# Patient Record
Sex: MALE | Race: WHITE | HISPANIC OR LATINO | Employment: FULL TIME | URBAN - METROPOLITAN AREA
[De-identification: names, ages, dates, MRNs, and addresses within clinical notes are randomized per-mention and may not be internally consistent; named-entity substitution may affect disease eponyms.]

---

## 2017-01-18 DIAGNOSIS — Z01.812 PRE-OPERATIVE LABORATORY EXAMINATION: ICD-10-CM

## 2017-01-18 DIAGNOSIS — Z01.810 PRE-OPERATIVE CARDIOVASCULAR EXAMINATION: ICD-10-CM

## 2017-01-18 LAB
ANION GAP SERPL CALC-SCNC: 9 MMOL/L (ref 0–11.9)
APPEARANCE UR: CLEAR
APTT PPP: 30.9 SEC (ref 24.7–36)
BILIRUB UR QL STRIP.AUTO: NEGATIVE
BUN SERPL-MCNC: 12 MG/DL (ref 8–22)
CALCIUM SERPL-MCNC: 9.2 MG/DL (ref 8.4–10.2)
CHLORIDE SERPL-SCNC: 102 MMOL/L (ref 96–112)
CO2 SERPL-SCNC: 25 MMOL/L (ref 20–33)
COLOR UR: YELLOW
CREAT SERPL-MCNC: 0.96 MG/DL (ref 0.5–1.4)
CULTURE IF INDICATED INDCX: NO UA CULTURE
EKG IMPRESSION: NORMAL
ERYTHROCYTE [DISTWIDTH] IN BLOOD BY AUTOMATED COUNT: 38.5 FL (ref 35.9–50)
EST. AVERAGE GLUCOSE BLD GHB EST-MCNC: 108 MG/DL
GFR SERPL CREATININE-BSD FRML MDRD: >60 ML/MIN/1.73 M 2
GLUCOSE SERPL-MCNC: 97 MG/DL (ref 65–99)
GLUCOSE UR STRIP.AUTO-MCNC: NEGATIVE MG/DL
HBA1C MFR BLD: 5.4 % (ref 0–5.6)
HCT VFR BLD AUTO: 45.8 % (ref 42–52)
HGB BLD-MCNC: 15.5 G/DL (ref 14–18)
HIV 1+2 AB+HIV1 P24 AG SERPL QL IA: NON REACTIVE
INR PPP: 1.01 (ref 0.87–1.13)
KETONES UR STRIP.AUTO-MCNC: NEGATIVE MG/DL
LEUKOCYTE ESTERASE UR QL STRIP.AUTO: NEGATIVE
MCH RBC QN AUTO: 28.2 PG (ref 27–33)
MCHC RBC AUTO-ENTMCNC: 33.8 G/DL (ref 33.7–35.3)
MCV RBC AUTO: 83.3 FL (ref 81.4–97.8)
MICRO URNS: NORMAL
NITRITE UR QL STRIP.AUTO: NEGATIVE
PH UR STRIP.AUTO: 6 [PH]
PLATELET # BLD AUTO: 240 K/UL (ref 164–446)
PMV BLD AUTO: 11.1 FL (ref 9–12.9)
POTASSIUM SERPL-SCNC: 4.3 MMOL/L (ref 3.6–5.5)
PROT UR QL STRIP: NEGATIVE MG/DL
PROTHROMBIN TIME: 13.1 SEC (ref 12–14.6)
RBC # BLD AUTO: 5.5 M/UL (ref 4.7–6.1)
RBC UR QL AUTO: NEGATIVE
SCCMEC + MECA PNL NOSE NAA+PROBE: NEGATIVE
SCCMEC + MECA PNL NOSE NAA+PROBE: NEGATIVE
SODIUM SERPL-SCNC: 136 MMOL/L (ref 135–145)
SP GR UR STRIP.AUTO: 1.02
WBC # BLD AUTO: 5.4 K/UL (ref 4.8–10.8)

## 2017-01-18 PROCEDURE — 85610 PROTHROMBIN TIME: CPT

## 2017-01-18 PROCEDURE — 87389 HIV-1 AG W/HIV-1&-2 AB AG IA: CPT

## 2017-01-18 PROCEDURE — 80048 BASIC METABOLIC PNL TOTAL CA: CPT

## 2017-01-18 PROCEDURE — 87641 MR-STAPH DNA AMP PROBE: CPT

## 2017-01-18 PROCEDURE — 87640 STAPH A DNA AMP PROBE: CPT

## 2017-01-18 PROCEDURE — 85730 THROMBOPLASTIN TIME PARTIAL: CPT

## 2017-01-18 PROCEDURE — 83036 HEMOGLOBIN GLYCOSYLATED A1C: CPT

## 2017-01-18 PROCEDURE — 85027 COMPLETE CBC AUTOMATED: CPT

## 2017-01-18 PROCEDURE — 81003 URINALYSIS AUTO W/O SCOPE: CPT

## 2017-01-18 PROCEDURE — 36415 COLL VENOUS BLD VENIPUNCTURE: CPT

## 2017-02-01 ENCOUNTER — APPOINTMENT (OUTPATIENT)
Dept: RADIOLOGY | Facility: MEDICAL CENTER | Age: 42
DRG: 470 | End: 2017-02-01
Attending: PHYSICIAN ASSISTANT
Payer: COMMERCIAL

## 2017-02-01 PROBLEM — M17.32 POST-TRAUMATIC OSTEOARTHRITIS OF LEFT KNEE: Status: ACTIVE | Noted: 2017-02-01

## 2017-02-01 PROCEDURE — 73560 X-RAY EXAM OF KNEE 1 OR 2: CPT | Mod: LT

## 2017-08-05 ENCOUNTER — HOSPITAL ENCOUNTER (EMERGENCY)
Facility: MEDICAL CENTER | Age: 42
End: 2017-08-05
Attending: EMERGENCY MEDICINE
Payer: COMMERCIAL

## 2017-08-05 ENCOUNTER — APPOINTMENT (OUTPATIENT)
Dept: RADIOLOGY | Facility: MEDICAL CENTER | Age: 42
End: 2017-08-05
Attending: EMERGENCY MEDICINE
Payer: COMMERCIAL

## 2017-08-05 VITALS
SYSTOLIC BLOOD PRESSURE: 153 MMHG | WEIGHT: 198.41 LBS | HEIGHT: 68 IN | OXYGEN SATURATION: 95 % | DIASTOLIC BLOOD PRESSURE: 87 MMHG | TEMPERATURE: 98.5 F | BODY MASS INDEX: 30.07 KG/M2 | HEART RATE: 73 BPM | RESPIRATION RATE: 16 BRPM

## 2017-08-05 DIAGNOSIS — S61.219A LACERATION OF FINGER OF RIGHT HAND, INITIAL ENCOUNTER: Primary | ICD-10-CM

## 2017-08-05 DIAGNOSIS — S67.21XA CRUSHING INJURY OF FINGER OF RIGHT HAND, INITIAL ENCOUNTER: ICD-10-CM

## 2017-08-05 PROCEDURE — 90471 IMMUNIZATION ADMIN: CPT

## 2017-08-05 PROCEDURE — 99283 EMERGENCY DEPT VISIT LOW MDM: CPT

## 2017-08-05 PROCEDURE — 304217 HCHG IRRIGATION SYSTEM

## 2017-08-05 PROCEDURE — 73130 X-RAY EXAM OF HAND: CPT | Mod: RT

## 2017-08-05 PROCEDURE — 700111 HCHG RX REV CODE 636 W/ 250 OVERRIDE (IP): Performed by: EMERGENCY MEDICINE

## 2017-08-05 PROCEDURE — 304992 HCHG REPAIR-COMPLEX-LVL 1,1.1-7.5CM

## 2017-08-05 PROCEDURE — 303747 HCHG EXTRA SUTURE

## 2017-08-05 PROCEDURE — 90715 TDAP VACCINE 7 YRS/> IM: CPT | Performed by: EMERGENCY MEDICINE

## 2017-08-05 RX ADMIN — CLOSTRIDIUM TETANI TOXOID ANTIGEN (FORMALDEHYDE INACTIVATED), CORYNEBACTERIUM DIPHTHERIAE TOXOID ANTIGEN (FORMALDEHYDE INACTIVATED), BORDETELLA PERTUSSIS TOXOID ANTIGEN (GLUTARALDEHYDE INACTIVATED), BORDETELLA PERTUSSIS FILAMENTOUS HEMAGGLUTININ ANTIGEN (FORMALDEHYDE INACTIVATED), BORDETELLA PERTUSSIS PERTACTIN ANTIGEN, AND BORDETELLA PERTUSSIS FIMBRIAE 2/3 ANTIGEN 0.5 ML: 5; 2; 2.5; 5; 3; 5 INJECTION, SUSPENSION INTRAMUSCULAR at 20:29

## 2017-08-05 ASSESSMENT — PAIN SCALES - GENERAL: PAINLEVEL_OUTOF10: 4

## 2017-08-05 NOTE — LETTER
"  FORM C-4:  EMPLOYEE’S CLAIM FOR COMPENSATION/ REPORT OF INITIAL TREATMENT  EMPLOYEE’S CLAIM - PROVIDE ALL INFORMATION REQUESTED   First Name  Kelvin Last Name  Lilly Birthdate             Age  1975 41 y.o. Sex  male Claim Number   Home Employee Address  [address]  [facility]                                     Zip  [address] Height  1.727 m (5' 8\") Weight  90 kg (198 lb 6.6 oz) [employer]  [id number]   Mailing Employee Address                           [address]   [place]               Zip  [address] Telephone  [phone] (home)  Primary Language Spoken  ENGLISH   Insurer  *** Third Party   RAYNE/ZHAO BELTRAN Employee's Occupation (Job Title) When Injury or Occupational Disease Occurred     Employer's Name  [id number] Telephone  [phone]    Employer Address   [facility] [address]] Zip  [address]   Date of Injury  8/5/2017       Hour of Injury  5:40 PM Date Employer Notified  8/5/2017 Last Day of Work after Injury or Occupational Disease  8/5/2017 Supervisor to Whom Injury Reported  KRIS PITTMAN   Address or Location of Accident (if applicable)  [[address] [address]]   What were you doing at the time of accident? (if applicable)  PUSHING CARPE MACHINE, SPRING HANDLE NOT IN SECURED PLACE AS MOVING MACHINE HANDLE FELL CLOSED ON HAND- HAS BLADE SPRING OPERATION    How did this injury or occupational disease occur? Be specific and answer in detail. Use additional sheet if necessary)  PUSHING CARPE MACHINE, SPRING HANDLE NOT IN SECURED PLACE AS MOVING MACHINE HANDLE FELL CLOSED ON HAND- HAS BLADE SPRING OPERATION   If you believe that you have an occupational disease, when did you first have knowledge of the disability and it relationship to your employment?  N/A Witnesses to the Accident  NO NAME     Nature of Injury or Occupational Disease  Laceration  Part(s) of Body Injured or Affected  Finger (R), N/A, N/A    I certify that " the above is true and correct to the best of my knowledge and that I have provided this information in order to obtain the benefits of [place]  I hereby authorize any physician, chiropractor, surgeon, practitioner, or other person, any hospital, including [facility] hospital, any medical service organization, any insurance company, or other institution or organization to release to each other, any medical or other information, including benefits paid or payable, pertinent to this injury or disease, except information relative to diagnosis, treatment and/or counseling for AIDS, psychological conditions, alcohol or controlled substances, for which I must give specific authorization.  A Photostat of this authorization shall be as valid as the original.   Date Place   Employee’s Signature   THIS REPORT MUST BE COMPLETED AND MAILED WITHIN 3 WORKING DAYS OF TREATMENT   Place  [facility], EMERGENCY DEPT  Name of Facility   [facility]   Date  8/5/2017 Diagnosis  (S61.219A) Laceration of finger of right hand, initial encounter  (primary encounter diagnosis)  (S67.21XA) Crushing injury of finger of right hand, initial encounter Is there evidence the injured employee was under the influence of alcohol and/or another controlled substance at the time of accident?   Hour  8:36 PM Description of Injury or Disease  Laceration of finger of right hand, initial encounter  Crushing injury of finger of right hand, initial encounter No   Treatment  Digital block. Single laceration repair. X-ray negative for fracture.  Have you advised the patient to remain off work five days or more?         No   X-Ray Findings  Negative   If Yes   From Date    To Date      From information given by the employee, together with medical evidence, can you directly connect this  "injury or occupational disease as job incurred?  Yes If No, is the employee capable of: Full Duty  No Modified Duty      Is additional medical care by a physician indicated?  Yes  Comments:follow-up and wound care, suture removal 2 days If Modified Duty, Specify any Limitations / Restrictions  Nonweightbearing right hand until seen by occupational health orthopedics for reevaluation on 8/7/17     Do you know of any previous injury or disease contributing to this condition or occupational disease?  No   Date  8/5/2017 Print Doctor’s Name  Susan Berrios certify the employer’s copy of this form was mailed on:   Address  [address]  [place] [id number] [phone] Insurer’s Use Only   [facility]  [address]    Provider’s Tax ID Number    Telephone  Dept: [phone]    Doctor’s Signature  e-SUSAN Gabriel D.O. Degree       Original - TREATING PHYSICIAN OR CHIROPRACTOR   Pg 2-Insurer/TPA   Pg 3-Employer   Pg 4-Employee                                                                                                  Form C-4 (rev01/03)     BRIEF DESCRIPTION OF RIGHTS AND BENEFITS  (Pursuant to [place] 616[place])    Notice of Injury or Occupational Disease (Incident Report Form C-1): If an injury or occupational disease (OD) arises out of and in the course of employment, you must provide written notice to your employer as soon as practicable, but no later than 7 days after the accident or OD. Your employer shall maintain a sufficient supply of the required forms.    Claim for Compensation (Form C-4): If medical treatment is sought, the form C-4 is available at the place of initial treatment. A completed \"Claim for Compensation\" (Form C-4) must be filed within 90 days after an accident or OD. The treating physician or chiropractor must, within 3 working days after treatment, complete and mail to the employer, the employer's insurer and third-party , the Claim for " Compensation.    Medical Treatment: If you require medical treatment for your on-the-job injury or OD, you may be required to select a physician or chiropractor from a list provided by your workers’ compensation insurer, if it has contracted with an Organization for Managed Care (MCO) or Preferred Provider Organization (PPO) or providers of health care. If your employer has not entered into a contract with an MCO or PPO, you may select a physician or chiropractor from the Panel of Physicians and Chiropractors. Any medical costs related to your industrial injury or OD will be paid by your insurer.    Temporary Total Disability (TTD): If your doctor has certified that you are unable to work for a period of at least 5 consecutive days, or 5 cumulative days in a 20-day period, or places restrictions on you that your employer does not accommodate, you may be entitled to TTD compensation.    Temporary Partial Disability (TPD): If the wage you receive upon reemployment is less than the compensation for TTD to which you are entitled, the insurer may be required to pay you TPD compensation to make up the difference. TPD can only be paid for a maximum of 24 months.    Permanent Partial Disability (PPD): When your medical condition is stable and there is an indication of a PPD as a result of your injury or OD, within 30 days, your insurer must arrange for an evaluation by a rating physician or chiropractor to determine the degree of your PPD. The amount of your PPD award depends on the date of injury, the results of the PPD evaluation and your age and wage.    Permanent Total Disability (PTD): If you are medically certified by a treating physician or chiropractor as permanently and totally disabled and have been granted a PTD status by your insurer, you are entitled to receive monthly benefits not to exceed 66 2/3% of your average monthly wage. The amount of your PTD payments is subject to reduction if you previously received  a PPD award.    Vocational Rehabilitation Services: You may be eligible for vocational rehabilitation services if you are unable to return to the job due to a permanent physical impairment or permanent restrictions as a result of your injury or occupational disease.    Transportation and Per Torsten Reimbursement: You may be eligible for travel expenses and per torsten associated with medical treatment.  Reopening: You may be able to reopen your claim if your condition worsens after claim closure.    Appeal Process: If you disagree with a written determination issued by the insurer or the insurer does not respond to your request, you may appeal to the Department of Administration, , by following the instructions contained in your determination letter. You must appeal the determination within 70 days from the date of the determination letter at [address], [address], [place], [place] [address], or [address], [place], [place] [address]. If you disagree with the  decision, you may appeal to the Department of Administration, . You must file your appeal within 30 days from the date of the  decision letter at [address], [address], [place], [place] [address], or [address], [place], [place] [address]. If you disagree with a decision of an , you may file a petition for judicial review with the District Court. You must do so within 30 days of the Appeal Officer’s decision. You may be represented by an  at your own expense or you may contact the [facility] for possible representation.    [place]  for Injured Workers (NAIW): If you disagree with a  decision, you may request that NAIW represent you without charge at an  Hearing. For information regarding denial of benefits, you may contact the [facility] at: [address], [address], [place] [address],  [phone], or [address], [address], [place] [address], [phone]    To File a Complaint with the Division: If you wish to file a complaint with the  of the Division of Industrial Relations (DIR), please contact the Workers’ Compensation Section, [address], [address], [place], [place] [address], telephone [phone]) [phone], or [address], [address], [place], [place] [address], telephone [phone].    For assistance with Workers’ Compensation Issues: you may contact the Office of the Governor Consumer Health Assistance, [address] [place] [address], [address], [place], [place] [address], Toll Free 1-716.498.4423, Web site: [email or web address]., E-mail [email or web address]                                                                                                                                                                               __________________________________________________________________                                    _________________            Employee Name / Signature                                                                                                                            Date                                       D-2 (rev. 10/07)

## 2017-08-05 NOTE — ED AVS SNAPSHOT
8/5/2017    Kelvin Carr  [address] [place] [address]    Dear Kelvin:    [facility] wants to ensure your discharge home is safe and you or your loved ones have had all of your questions answered regarding your care after you leave the hospital.    Below is a list of resources and contact information should you have any questions regarding your hospital stay, follow-up instructions, or active medical symptoms.    Questions or Concerns Regarding… Contact   Medical Questions Related to Your Discharge  (7 days a week, 8am-5pm) Contact a Nurse Care Coordinator   [phone]   Medical Questions Not Related to Your Discharge  (24 hours a day / 7 days a week)  Contact the Nurse Health Line   [phone]    Medications or Discharge Instructions Refer to your discharge packet   or contact your [facility] Primary Care Provider   [phone]   Follow-up Appointment(s) Schedule your appointment via Revuze   or contact Scheduling [phone]   Billing Review your statement via Revuze  or contact Billing [phone]   Medical Records Review your records via Revuze   or contact Medical Records [phone]     You may receive a telephone call within two days of discharge. This call is to make certain you understand your discharge instructions and have the opportunity to have any questions answered. You can also easily access your medical information, test results and upcoming appointments via the Revuze free online health management tool. You can learn more and sign up at [employer]Revuze. For assistance setting up your Revuze account, please call [phone].    Once again, we want to ensure your discharge home is safe and that you have a clear understanding of any next steps in your care. If you have any questions or concerns, please do not hesitate to contact us, we are here for you. Thank you for choosing [facility] for your healthcare needs.    Sincerely,    Your [facility] Healthcare Team

## 2017-08-05 NOTE — LETTER
"  FORM C-4:  EMPLOYEE’S CLAIM FOR COMPENSATION/ REPORT OF INITIAL TREATMENT  EMPLOYEE’S CLAIM - PROVIDE ALL INFORMATION REQUESTED   First Name  Kelvin Last Name  Lilly Birthdate             Age  1975 41 y.o. Sex  male Claim Number   Home Employee Address  [address]  [facility] [place]                                     Zip  [address] Height  1.727 m (5' 8\") Weight  90 kg (198 lb 6.6 oz) [facility]     Mailing Employee Address                           [address]   [facility]               Zip  [address] Telephone  [phone] (home)  Primary Language Spoken  ENGLISH   Insurer  Unable to Obtain Third Party   DAIJA [employer] Employee's Occupation (Job Title) When Injury or Occupational Disease Occurred   [employer]   Employer's Name  [id number] Telephone  [phone]    Employer Address  [address] Zip  [address]   Date of Injury  8/5/2017       Hour of Injury  5:40 PM Date Employer Notified  8/5/2017 Last Day of Work after Injury or Occupational Disease  8/5/2017 Supervisor to Whom Injury Reported  KRIS PITTMAN   Address or Location of Accident (if applicable)  [[address] [address]]   What were you doing at the time of accident? (if applicable)  PUSHING CARPE MACHINE, SPRING HANDLE NOT IN SECURED PLACE AS MOVING MACHINE HANDLE FELL CLOSED ON HAND- HAS BLADE SPRING OPERATION    How did this injury or occupational disease occur? Be specific and answer in detail. Use additional sheet if necessary)  PUSHING CARPE MACHINE, SPRING HANDLE NOT IN SECURED PLACE AS MOVING MACHINE HANDLE FELL CLOSED ON HAND- HAS BLADE SPRING OPERATION   If you believe that you have an occupational disease, when did you first have knowledge of the disability and it relationship to your employment?  N/A Witnesses to the Accident  NO NAME     Nature of Injury or Occupational Disease  Laceration  Part(s) of Body Injured " or Affected  Finger (R), N/A, N/A    I certify that the above is true and correct to the best of my knowledge and that I have provided this information in order to obtain the benefits of [place]  I hereby authorize any physician, chiropractor, surgeon, practitioner, or other person, any hospital, including [facility] or [facility], any medical service organization, any insurance company, or other institution or organization to release to each other, any medical or other information, including benefits paid or payable, pertinent to this injury or disease, except information relative to diagnosis, treatment and/or counseling for AIDS, psychological conditions, alcohol or controlled substances, for which I must give specific authorization.  A Photostat of this authorization shall be as valid as the original.   Date  08/05/17 [facility] [place] [facility]   Employee’s Signature   THIS REPORT MUST BE COMPLETED AND MAILED WITHIN 3 WORKING DAYS OF TREATMENT   Place  [facility], EMERGENCY DEPT  Name of Facility   [facility]   Date  8/5/2017 Diagnosis  (S61.219A) Laceration of finger of right hand, initial encounter  (primary encounter diagnosis)  (S67.21XA) Crushing injury of finger of right hand, initial encounter Is there evidence the injured employee was under the influence of alcohol and/or another controlled substance at the time of accident?   Hour  8:37 PM Description of Injury or Disease  Laceration of finger of right hand, initial encounter  Crushing injury of finger of right hand, initial encounter No   Treatment  Digital block. Single laceration repair. X-ray negative for fracture.  Have you advised the patient to remain off work five days or more?         No   X-Ray Findings  Negative   If Yes   From Date    To Date      From information  "given by the employee, together with medical evidence, can you directly connect this injury or occupational disease as job incurred?  Yes If No, is the employee capable of: Full Duty  No Modified Duty      Is additional medical care by a physician indicated?  Yes  Comments:follow-up and wound care, suture removal 2 days If Modified Duty, Specify any Limitations / Restrictions  Nonweightbearing right hand until seen by occupational health orthopedics for reevaluation on 8/7/17     Do you know of any previous injury or disease contributing to this condition or occupational disease?  No   Date  8/5/2017 Print Doctor’s Name  Susan Berrios certify the employer’s copy of this form was mailed on:   Address  [address] [address] [phone] Insurer’s Use Only   [facility]  [address]    Provider’s Tax ID Number    Telephone  Dept: [phone]    Doctor’s Signature  e-SUSAN Gabriel D.O. Degree   M.D.    Original - TREATING PHYSICIAN OR CHIROPRACTOR   Pg 2-Insurer/TPA   Pg 3-Employer   Pg 4-Employee                                                                                                  Form C-4 (rev01/03)     BRIEF DESCRIPTION OF RIGHTS AND BENEFITS  ([place]ursuant to [place] 616[place])    Notice of Injury or Occupational Disease (Incident Report Form C-1): If an injury or occupational disease (OD) arises out of and in the course of employment, you must provide written notice to your employer as soon as practicable, but no later than 7 days after the accident or OD. Your employer shall maintain a sufficient supply of the required forms.    Claim for Compensation (Form C-4): If medical treatment is sought, the form C-4 is available at the place of initial treatment. A completed \"Claim for Compensation\" (Form C-4) must be filed within 90 days after an accident or OD. The treating physician or chiropractor must, within 3 working days after treatment, complete and mail to the " employer, the employer's insurer and third-party , the Claim for Compensation.    Medical Treatment: If you require medical treatment for your on-the-job injury or OD, you may be required to select a physician or chiropractor from a list provided by your workers’ compensation insurer, if it has contracted with an Organization for Managed Care (MCO) or Preferred Provider Organization (PPO) or providers of health care. If your employer has not entered into a contract with an MCO or PPO, you may select a physician or chiropractor from the Panel of Physicians and Chiropractors. Any medical costs related to your industrial injury or OD will be paid by your insurer.    Temporary Total Disability (TTD): If your doctor has certified that you are unable to work for a period of at least 5 consecutive days, or 5 cumulative days in a 20-day period, or places restrictions on you that your employer does not accommodate, you may be entitled to TTD compensation.    Temporary Partial Disability (TPD): If the wage you receive upon reemployment is less than the compensation for TTD to which you are entitled, the insurer may be required to pay you TPD compensation to make up the difference. TPD can only be paid for a maximum of 24 months.    Permanent Partial Disability (PPD): When your medical condition is stable and there is an indication of a PPD as a result of your injury or OD, within 30 days, your insurer must arrange for an evaluation by a rating physician or chiropractor to determine the degree of your PPD. The amount of your PPD award depends on the date of injury, the results of the PPD evaluation and your age and wage.    Permanent Total Disability (PTD): If you are medically certified by a treating physician or chiropractor as permanently and totally disabled and have been granted a PTD status by your insurer, you are entitled to receive monthly benefits not to exceed 66 2/3% of your average monthly wage. The  amount of your PTD payments is subject to reduction if you previously received a PPD award.    Vocational Rehabilitation Services: You may be eligible for vocational rehabilitation services if you are unable to return to the job due to a permanent physical impairment or permanent restrictions as a result of your injury or occupational disease.    Transportation and Per Torsten Reimbursement: You may be eligible for travel expenses and per torsten associated with medical treatment.  Reopening: You may be able to reopen your claim if your condition worsens after claim closure.    Appeal Process: If you disagree with a written determination issued by the insurer or the insurer does not respond to your request, you may appeal to the Department of Administration, , by following the instructions contained in your determination letter. You must appeal the determination within 70 days from the date of the determination letter at [address], [address], [place], [place] [address], or [address], [address], [place], [place] [address]. If you disagree with the  decision, you may appeal to the Department of Administration, . You must file your appeal within 30 days from the date of the  decision letter at [address], [address], [place], [place] [address], or [address], [address], [place], [place] [address]. If you disagree with a decision of an , you may file a petition for judicial review with the District Court. You must do so within 30 days of the Appeal Officer’s decision. You may be represented by an  at your own expense or you may contact the [facility] for possible representation.    [place]  for Injured Workers (NAIW): If you disagree with a  decision, you may request that NAIW represent you without charge at an  Hearing. For information regarding denial of benefits, you may  contact the [facility] at: [address], [address], [place] [address], [phone], or [address], [place] [address], [phone]    To File a Complaint with the Division: If you wish to file a complaint with the  of the Division of Industrial Relations (DIR), please contact the Workers’ Compensation Section, [address], [address], [place], [place] [address], telephone ([phone]) [phone], or [address], [address], [place], [place] [address], telephone [phone].    For assistance with Workers’ Compensation Issues: you may contact the Office of the Governor Consumer Health Assistance, [address] [place] [address], [address], [place], [place] [address], Toll Free 1-267.877.9329, Web site: http://govcha.[facility].nv., E-mail [email or web address].                                                                                                                                                                               __________________________________________________________________                                    08/05/17            Employee Name / Signature                                                                                                                            Date                                       D-2 (rev. 10/07)

## 2017-08-05 NOTE — ED AVS SNAPSHOT
Home Care Instructions                                                                                                                Kelvin Carr   MRN: [id number]    Department:  [facility], Emergency Dept   Date of Visit:  8/5/2017            [facility], Emergency Dept    [address]    [place] [address]    Phone:  [phone]      You were seen by     Susan Berrios D.O.      Your Diagnosis Was     Laceration of finger of right hand, initial encounter     S61.219A       These are the medications you received during your hospitalization from 08/05/2017 1817 to 08/05/2017 2050     Date/Time Order Dose Route Action    08/05/2017 2029 tetanus-dipth-acell pertussis (ADACEL) inj 0.5 mL 0.5 mL Intramuscular Given      Follow-up Information     1. Follow up with [facility] In 2 days.    Contact information    [address] [place] [address] [phone]          2. Follow up with Sunday Sanford M.D..    Specialty:  Orthopaedics    Why:  orthopedics    Contact information    [address]  [address]  [place] [address] [phone]          3. Follow up with [facility], Emergency Dept In 2 days.    Specialty:  Emergency Medicine    Why:  For wound re-check    Contact information    [address] [place] [address] [phone]        4. Follow up with [facility], Emergency Dept In 1 week.    Specialty:  Emergency Medicine    Why:  For suture removal    Contact information    [address] [place] [id number] [phone]      Medication Information     Review all of your home medications and newly ordered medications with your primary doctor and/or pharmacist as soon as possible. Follow medication instructions as directed by your doctor and/or pharmacist.     Please keep your complete medication list with you and share with your physician. Update the information when  medications are discontinued, doses are changed, or new medications (including over-the-counter products) are added; and carry medication information at all times in the event of emergency situations.               Medication List      ASK your doctor about these medications        Instructions    Morning Afternoon Evening Bedtime    acetaminophen 500 MG Tabs   Commonly known as:  TYLENOL        Take 2 Tabs by mouth every 8 hours.   Dose:  1000 mg                        Aspirin 325 MG Tbec        Take 1 Tab by mouth 2 times a day.   Dose:  325 mg                         MG Caps        Take 100 mg by mouth 2 Times a Day.   Dose:  100 mg                        losartan 50 MG Tabs   Commonly known as:  COZAAR        Take 50 mg by mouth every day.   Dose:  50 mg                                Procedures and tests performed during your visit     DX-HAND 3+ RIGHT        Discharge Instructions       Follow-up with occupational health, orthopedics or emergency department in 48 hours for reevaluation and wound check.    Follow-up with occupational health, orthopedics or emergency department in 7-10 days for suture removal.    Keep wound clean, dry and intact. Leave dressing in place until seen for reevaluation in 48 hours. Avoid soaking hand in water.    Tylenol or Motrin as needed for discomfort.  Rest, elevation as needed for swelling or discomfort.    Return to the emergency department for increased pain, swelling, redness, bleeding or other discharge, fever or other new concerns.    Laceration Care, Adult  A laceration is a cut that goes through all of the layers of the skin and into the tissue that is right under the skin. Some lacerations heal on their own. Others need to be closed with stitches (sutures), staples, skin adhesive strips, or skin glue. Proper laceration care minimizes the risk of infection and helps the laceration to heal better.  HOW TO CARE FOR YOUR LACERATION  If sutures or staples were  used:  · Keep the wound clean and dry.  · If you were given a bandage (dressing), you should change it at least one time per day or as told by your health care provider. You should also change it if it becomes wet or dirty.  · Keep the wound completely dry for the first 24 hours or as told by your health care provider. After that time, you may shower or bathe. However, make sure that the wound is not soaked in water until after the sutures or staples have been removed.  · Clean the wound one time each day or as told by your health care provider:  ¨ Wash the wound with soap and water.  ¨ Rinse the wound with water to remove all soap.  ¨ Pat the wound dry with a clean towel. Do not rub the wound.  · After cleaning the wound, apply a thin layer of antibiotic ointment as told by your health care provider. This will help to prevent infection and keep the dressing from sticking to the wound.  · Have the sutures or staples removed as told by your health care provider.  If skin adhesive strips were used:  · Keep the wound clean and dry.  · If you were given a bandage (dressing), you should change it at least one time per day or as told by your health care provider. You should also change it if it becomes dirty or wet.  · Do not get the skin adhesive strips wet. You may shower or bathe, but be careful to keep the wound dry.  · If the wound gets wet, pat it dry with a clean towel. Do not rub the wound.  · Skin adhesive strips fall off on their own. You may trim the strips as the wound heals. Do not remove skin adhesive strips that are still stuck to the wound. They will fall off in time.  If skin glue was used:  · Try to keep the wound dry, but you may briefly wet it in the shower or bath. Do not soak the wound in water, such as by swimming.  · After you have showered or bathed, gently pat the wound dry with a clean towel. Do not rub the wound.  · Do not do any activities that will make you sweat heavily until the skin glue  has fallen off on its own.  · Do not apply liquid, cream, or ointment medicine to the wound while the skin glue is in place. Using those may loosen the film before the wound has healed.  · If you were given a bandage (dressing), you should change it at least one time per day or as told by your health care provider. You should also change it if it becomes dirty or wet.  · If a dressing is placed over the wound, be careful not to apply tape directly over the skin glue. Doing that may cause the glue to be pulled off before the wound has healed.  · Do not pick at the glue. The skin glue usually remains in place for 5-10 days, then it falls off of the skin.  General Instructions  · Take over-the-counter and prescription medicines only as told by your health care provider.  · If you were prescribed an antibiotic medicine or ointment, take or apply it as told by your doctor. Do not stop using it even if your condition improves.  · To help prevent scarring, make sure to cover your wound with sunscreen whenever you are outside after stitches are removed, after adhesive strips are removed, or when glue remains in place and the wound is healed. Make sure to wear a sunscreen of at least 30 SPF.  · Do not scratch or pick at the wound.  · Keep all follow-up visits as told by your health care provider. This is important.  · Check your wound every day for signs of infection. Watch for:  ¨ Redness, swelling, or pain.  ¨ Fluid, blood, or pus.  · Raise (elevate) the injured area above the level of your heart while you are sitting or lying down, if possible.  SEEK MEDICAL CARE IF:  · You received a tetanus shot and you have swelling, severe pain, redness, or bleeding at the injection site.  · You have a fever.  · A wound that was closed breaks open.  · You notice a bad smell coming from your wound or your dressing.  · You notice something coming out of the wound, such as wood or glass.  · Your pain is not controlled with  medicine.  · You have increased redness, swelling, or pain at the site of your wound.  · You have fluid, blood, or pus coming from your wound.  · You notice a change in the color of your skin near your wound.  · You need to change the dressing frequently due to fluid, blood, or pus draining from the wound.  · You develop a new rash.  · You develop numbness around the wound.  SEEK IMMEDIATE MEDICAL CARE IF:  · You develop severe swelling around the wound.  · Your pain suddenly increases and is severe.  · You develop painful lumps near the wound or on skin that is anywhere on your body.  · You have a red streak going away from your wound.  · The wound is on your hand or foot and you cannot properly move a finger or toe.  · The wound is on your hand or foot and you notice that your fingers or toes look pale or bluish.     This information is not intended to replace advice given to you by your health care provider. Make sure you discuss any questions you have with your health care provider.     Document Released: 12/18/2006 Document Revised: 05/03/2016 Document Reviewed: 12/14/2015  [employer] Interactive Patient Education ©2016 [employer]  Crush Injury, Fingers or Toes  A crush injury means the fingers or toes are hurt by being squeezed (compressed).  HOME CARE  · Raise (elevate) the injured part above the level of your heart. Do this as much as you can for the first few days.  · Put ice on the injured area.  ¨ Put ice in a plastic bag.  ¨ Place a towel between your skin and the bag.  ¨ Leave the ice on for 15-20 minutes, 03-04 times a day for the first 2 days.  · Only take medicine as told by your doctor.  · Use the injured part only as told by your doctor.  · Change bandages (dressings) as told by your doctor.  · Keep all doctor visits as told.  GET HELP RIGHT AWAY IF:   · There is redness, puffiness (swelling), or more pain in the injured finger or toe.  · Yellowish-white fluid (pus) comes from the wound.  · You  have a fever.  · A bad smell comes from the wound or bandage.  · The wound breaks open.  · You cannot move the injured finger or toe.  MAKE SURE YOU:   · Understand these instructions.  · Will watch your condition.  · Will get help right away if you are not doing well or get worse.     This information is not intended to replace advice given to you by your health care provider. Make sure you discuss any questions you have with your health care provider.     Document Released: 06/07/2011 Document Revised: 03/11/2013 Document Reviewed: 05/04/2012  [employer] Interactive Patient Education ©2016 [employer]            Patient Information     Patient Information    Following emergency treatment: all patient requiring follow-up care must return either to a private physician or a clinic if your condition worsens before you are able to obtain further medical attention, please return to the emergency room.     Billing Information    [facility] we work to make the billing process streamlined for our patients.  Our Representatives are here to answer any questions you may have regarding your hospital bill.  If you have insurance coverage and have supplied your insurance information to us, we will submit a claim to your insurer on your behalf.  Should you have any questions regarding your bill, we can be reached online or by phone as follows:  Online: You are able pay your bills online or live chat with our representatives about any billing questions you may have. We are here to help Monday - Friday from 8:00am to 7:30pm and 9:00am - 12:00pm on Saturdays.  Please visit [email or web address]  for more information.   Phone:  [phone] or [phone]    Please note that your emergency physician, surgeon, pathologist, radiologist, anesthesiologist, and other specialists are not employed by [facility] and will therefore bill separately for their services.  Please contact them directly for any  questions concerning their bills at the numbers below:     Emergency Physician Services:  [phone]  [facility]:  [phone]  [facility]:  [phone]  [facility]:  [phone]    1. Your final bill may vary from the amount quoted upon discharge if all procedures are not complete at that time, or if your doctor has additional procedures of which we are not aware. You will receive an additional bill if you return to the Emergency Department at [facility] for suture removal regardless of the facility of which the sutures were placed.     2. Please arrange for settlement of this account at the emergency registration.    3. All self-pay accounts are due in full at the time of treatment.  If you are unable to meet this obligation then payment is expected within 4-5 days.     4. If you have had radiology studies (CT, X-ray, Ultrasound, MRI), you have received a preliminary result during your emergency department visit. Please contact the radiology department [phone] to receive a copy of your final result. Please discuss the Final result with your primary physician or with the follow up physician provided.     Crisis Hotline:  [place] Crisis Hotline:  0-355-AHZATBR or 1-117.728.7899  [place] Crisis Hotline:    1-973.350.4258 or 049-840-6631         ED Discharge Follow Up Questions    1. In order to provide you with very good care, we would like to follow up with a phone call in the next few days.  May we have your permission to contact you?     YES /  NO    2. What is the best phone number to call you? (       )_____-__________    3. What is the best time to call you?      Morning  /  Afternoon  /  Evening                   Patient Signature:  ____________________________________________________________    Date:  ____________________________________________________________

## 2017-08-05 NOTE — ED AVS SNAPSHOT
PostHelpers Access Code: [email or web address]  Expires: 9/4/2017  8:49 PM    PostHelpers  A secure, online tool to manage your health information     [employer]’s PostHelpers® is a secure, online tool that connects you to your personalized health information from the privacy of your home -- day or night - making it very easy for you to manage your healthcare. Once the activation process is completed, you can even access your medical information using the PostHelpers xavier, which is available for free in the Apple Xavier store or Google Play store.     PostHelpers provides the following levels of access (as shown below):   My Chart Features   [facility] Primary Care Doctor [facility]  Specialists [facility]  Urgent  Care Non-[facility]  Primary Care  Doctor   Email your healthcare team securely and privately 24/7 X X X X   Manage appointments: schedule your next appointment; view details of past/upcoming appointments X      Request prescription refills. X      View recent personal medical records, including lab and immunizations X X X X   View health record, including health history, allergies, medications X X X X   Read reports about your outpatient visits, procedures, consult and ER notes X X X X   See your discharge summary, which is a recap of your hospital and/or ER visit that includes your diagnosis, lab results, and care plan. X X       How to register for PostHelpers:  1. Go to  [email or web address].  2. Click on the Sign Up Now box, which takes you to the New Member Sign Up page. You will need to provide the following information:  a. Enter your PostHelpers Access Code exactly as it appears at the top of this page. (You will not need to use this code after you’ve completed the sign-up process. If you do not sign up before the expiration date, you must request a new code.)   b. Enter your date of birth.   c. Enter your home email address.   d. Click Submit, and follow the next screen’s instructions.  3. Create a PostHelpers ID. This will be your [employer]  login ID and cannot be changed, so think of one that is secure and easy to remember.  4. Create a [employer] password. You can change your password at any time.  5. Enter your Password Reset Question and Answer. This can be used at a later time if you forget your password.   6. Enter your e-mail address. This allows you to receive e-mail notifications when new information is available in Ev[employer]ental.  7. Click Sign Up. You can now view your health information.    For assistance activating your Ev[employer]ental account, call [phone]

## 2017-08-06 NOTE — DISCHARGE INSTRUCTIONS
Follow-up with occupational health, orthopedics or emergency department in 48 hours for reevaluation and wound check.    Follow-up with occupational health, orthopedics or emergency department in 7-10 days for suture removal.    Keep wound clean, dry and intact. Leave dressing in place until seen for reevaluation in 48 hours. Avoid soaking hand in water.    Tylenol or Motrin as needed for discomfort.  Rest, elevation as needed for swelling or discomfort.    Return to the emergency department for increased pain, swelling, redness, bleeding or other discharge, fever or other new concerns.    Laceration Care, Adult  A laceration is a cut that goes through all of the layers of the skin and into the tissue that is right under the skin. Some lacerations heal on their own. Others need to be closed with stitches (sutures), staples, skin adhesive strips, or skin glue. Proper laceration care minimizes the risk of infection and helps the laceration to heal better.  HOW TO CARE FOR YOUR LACERATION  If sutures or staples were used:  · Keep the wound clean and dry.  · If you were given a bandage (dressing), you should change it at least one time per day or as told by your health care provider. You should also change it if it becomes wet or dirty.  · Keep the wound completely dry for the first 24 hours or as told by your health care provider. After that time, you may shower or bathe. However, make sure that the wound is not soaked in water until after the sutures or staples have been removed.  · Clean the wound one time each day or as told by your health care provider:  ¨ Wash the wound with soap and water.  ¨ Rinse the wound with water to remove all soap.  ¨ Pat the wound dry with a clean towel. Do not rub the wound.  · After cleaning the wound, apply a thin layer of antibiotic ointment as told by your health care provider. This will help to prevent infection and keep the dressing from sticking to the wound.  · Have the sutures  or staples removed as told by your health care provider.  If skin adhesive strips were used:  · Keep the wound clean and dry.  · If you were given a bandage (dressing), you should change it at least one time per day or as told by your health care provider. You should also change it if it becomes dirty or wet.  · Do not get the skin adhesive strips wet. You may shower or bathe, but be careful to keep the wound dry.  · If the wound gets wet, pat it dry with a clean towel. Do not rub the wound.  · Skin adhesive strips fall off on their own. You may trim the strips as the wound heals. Do not remove skin adhesive strips that are still stuck to the wound. They will fall off in time.  If skin glue was used:  · Try to keep the wound dry, but you may briefly wet it in the shower or bath. Do not soak the wound in water, such as by swimming.  · After you have showered or bathed, gently pat the wound dry with a clean towel. Do not rub the wound.  · Do not do any activities that will make you sweat heavily until the skin glue has fallen off on its own.  · Do not apply liquid, cream, or ointment medicine to the wound while the skin glue is in place. Using those may loosen the film before the wound has healed.  · If you were given a bandage (dressing), you should change it at least one time per day or as told by your health care provider. You should also change it if it becomes dirty or wet.  · If a dressing is placed over the wound, be careful not to apply tape directly over the skin glue. Doing that may cause the glue to be pulled off before the wound has healed.  · Do not pick at the glue. The skin glue usually remains in place for 5-10 days, then it falls off of the skin.  General Instructions  · Take over-the-counter and prescription medicines only as told by your health care provider.  · If you were prescribed an antibiotic medicine or ointment, take or apply it as told by your doctor. Do not stop using it even if your  condition improves.  · To help prevent scarring, make sure to cover your wound with sunscreen whenever you are outside after stitches are removed, after adhesive strips are removed, or when glue remains in place and the wound is healed. Make sure to wear a sunscreen of at least 30 SPF.  · Do not scratch or pick at the wound.  · Keep all follow-up visits as told by your health care provider. This is important.  · Check your wound every day for signs of infection. Watch for:  ¨ Redness, swelling, or pain.  ¨ Fluid, blood, or pus.  · Raise (elevate) the injured area above the level of your heart while you are sitting or lying down, if possible.  SEEK MEDICAL CARE IF:  · You received a tetanus shot and you have swelling, severe pain, redness, or bleeding at the injection site.  · You have a fever.  · A wound that was closed breaks open.  · You notice a bad smell coming from your wound or your dressing.  · You notice something coming out of the wound, such as wood or glass.  · Your pain is not controlled with medicine.  · You have increased redness, swelling, or pain at the site of your wound.  · You have fluid, blood, or pus coming from your wound.  · You notice a change in the color of your skin near your wound.  · You need to change the dressing frequently due to fluid, blood, or pus draining from the wound.  · You develop a new rash.  · You develop numbness around the wound.  SEEK IMMEDIATE MEDICAL CARE IF:  · You develop severe swelling around the wound.  · Your pain suddenly increases and is severe.  · You develop painful lumps near the wound or on skin that is anywhere on your body.  · You have a red streak going away from your wound.  · The wound is on your hand or foot and you cannot properly move a finger or toe.  · The wound is on your hand or foot and you notice that your fingers or toes look pale or bluish.     This information is not intended to replace advice given to you by your health care provider. Make  sure you discuss any questions you have with your health care provider.     Document Released: 12/18/2006 Document Revised: 05/03/2016 Document Reviewed: 12/14/2015  [employer] Patient Education ©2016 [employer].  Crush Injury, Fingers or Toes  A crush injury means the fingers or toes are hurt by being squeezed (compressed).  HOME CARE  · Raise (elevate) the injured part above the level of your heart. Do this as much as you can for the first few days.  · Put ice on the injured area.  ¨ Put ice in a plastic bag.  ¨ Place a towel between your skin and the bag.  ¨ Leave the ice on for 15-20 minutes, 03-04 times a day for the first 2 days.  · Only take medicine as told by your doctor.  · Use the injured part only as told by your doctor.  · Change bandages (dressings) as told by your doctor.  · Keep all doctor visits as told.  GET HELP RIGHT AWAY IF:   · There is redness, puffiness (swelling), or more pain in the injured finger or toe.  · Yellowish-white fluid (pus) comes from the wound.  · You have a fever.  · A bad smell comes from the wound or bandage.  · The wound breaks open.  · You cannot move the injured finger or toe.  MAKE SURE YOU:   · Understand these instructions.  · Will watch your condition.  · Will get help right away if you are not doing well or get worse.     This information is not intended to replace advice given to you by your health care provider. Make sure you discuss any questions you have with your health care provider.     Document Released: 06/07/2011 Document Revised: 03/11/2013 Document Reviewed: 05/04/2012  PlanG Patient Education ©2016 [employer].

## 2017-08-06 NOTE — ED PROVIDER NOTES
"ED Provider Note    CHIEF COMPLAINT  Chief Complaint   Patient presents with   • Laceration       HPI  Kelvin Carr is a 41 y.o. male who presents to the emergency department for right 5th finger injury. Patient was using a  when the safety lock malfunctioned and the lower portion of the cutter trapped and cut his finger. Constant aching pain worse with palpation or range of motion, however without deformity or significant swelling. Bleeding controlled with light pressure prior to arrival. Unknown last tetanus, likely greater than 5 years.    REVIEW OF SYSTEMS  See HPI for further details. All other systems are negative.     PAST MEDICAL HISTORY   has a past medical history of Hypertension and Arthritis.    SOCIAL HISTORY  Social History     Social History Main Topics   • Smoking status: Former Smoker     Quit date: 01/01/2017   • Smokeless tobacco: Never Used   • Alcohol Use: Yes      Comment: 2-3 per week   • Drug Use: Yes      Comment: marijuana until 1 month ago   • Sexual Activity: Not on file       SURGICAL HISTORY   has past surgical history that includes knee arthroscopy (Left, 2000, 2008) and knee arthroplasty total (Left, 2/1/2017).    CURRENT MEDICATIONS  Home Medications     Reviewed by Jayesh Fontana R.N. (Registered Nurse) on 08/05/17 at 1830  Med List Status: Complete    Medication Last Dose Status    acetaminophen (TYLENOL) 500 MG Tab  Active    aspirin  MG Tablet Delayed Response 8/5/2017 Active    docusate sodium 100 MG Cap  Active    losartan (COZAAR) 50 MG Tab 7/29/2017 Active                ALLERGIES  Allergies   Allergen Reactions   • Penicillin G Rash     .       PHYSICAL EXAM  VITAL SIGNS: /87 mmHg  Pulse 73  Temp(Src) 36.9 °C (98.5 °F)  Resp 16  Ht 1.727 m (5' 8\")  Wt 90 kg (198 lb 6.6 oz)  BMI 30.18 kg/m2  SpO2 95%  Pulse ox interpretation: I interpret this pulse ox as normal.  Constitutional: Alert in no apparent distress.  HENT: Normocephalic, " atraumatic. Bilateral external ears normal, Nose normal. Moist mucous membranes.    Eyes: Pupils are equal and reactive, Conjunctiva normal.   Neck: Normal range of motion, Supple.  Cardiovascular: Normal peripheral perfusion.  Thorax & Lungs: Nonlabored respirations.  Skin: Warm, Dry.  Musculoskeletal: 3 cm V-shaped flap laceration at the lateral aspect of the 5th finger. No active bleeding or hematoma. Range of motion is intact, full flexion and extension at finger. No gross deformity or swelling. Less than 2 2nd capillary refill, sensation intact to light touch.  Neurologic: Alert , no gross focal deficit noted.  Psychiatric: Affect normal, Judgment normal, Mood normal.       DIAGNOSTIC STUDIES / PROCEDURES    RADIOLOGY  DX-HAND 3+ RIGHT   Final Result      No acute fracture is identified.        PROCEDURES    DIGITAL BLOCK  Indication: Right 5th finger laceration  Lidocaine 1% without epinephrine injected 3 mL each at medial and lateral aspects of the dorsal proximal 5th finger. Complete anesthesia noted following the procedure. Patient tolerated procedure well.    LACERATION REPAIR PROCEDURE NOTE  The patient's identification was confirmed and consent was obtained.  This procedure was performed by Dr. Berrios  Site: Right 5th finger  Sterile procedures observed  Anesthetic used (type and amt):   Suture type/size: 4-0 nylon  Length: 3 cm  # of Sutures: 7  Technique: Simple interrupted  Complexity: Complex, irregularly shaped  Antibx ointment applied, Adaptic, gauze and finger splint applied by myself.  Tetanus ordered.  Site anesthetized, irrigated with NS, explored without evidence of foreign body, wound well approximated, site covered with dry, sterile dressing. Patient tolerated procedure well without complications. Instructions for care discussed verbally and patient provided with additional written instructions for homecare and f/u.    COURSE & MEDICAL DECISION MAKING  Finger laceration was repaired as  described above with good hemostasis and approximation. Nonstick dressing and finger splint applied. X-ray negative for fracture or dislocation. CMS is intact distally. Tetanus is updated. Pain controlled without medications in the emergency department.    Patient is stable for discharge at this time, anticipatory guidance and wound care instructions discussed, Tylenol or Motrin for discomfort, close follow-up is encouraged with occupational health/orthopedic/emergency department in 48 hours for wound check and his significant other are and 7-10 days for suture removal, and strict ED return instructions have been detailed. Patient  agreeable to the disposition and plan.    Patient's blood pressure was elevated in the emergency department, and has been referred to primary care for close monitoring.    FINAL IMPRESSION  (S61.219A) Laceration of finger of right hand, initial encounter  (primary encounter diagnosis)  (S67.21XA) Crushing injury of finger of right hand, initial encounter      Electronically signed by: Susan Berrios, 8/5/2017 7:03 PM      This dictation was created using voice recognition software. The accuracy of the dictation is limited to the abilities of the software. I expect there may be some errors of grammar and possibly content. The nursing notes were reviewed and certain aspects of this information were incorporated into this note.

## 2017-08-07 ENCOUNTER — HOSPITAL ENCOUNTER (EMERGENCY)
Facility: MEDICAL CENTER | Age: 42
End: 2017-08-07
Attending: EMERGENCY MEDICINE
Payer: COMMERCIAL

## 2017-08-07 VITALS
TEMPERATURE: 97.9 F | BODY MASS INDEX: 30.14 KG/M2 | OXYGEN SATURATION: 96 % | RESPIRATION RATE: 16 BRPM | DIASTOLIC BLOOD PRESSURE: 91 MMHG | HEART RATE: 78 BPM | SYSTOLIC BLOOD PRESSURE: 141 MMHG | WEIGHT: 198.19 LBS

## 2017-08-07 DIAGNOSIS — S61.219D FINGER LACERATION, SUBSEQUENT ENCOUNTER: Primary | ICD-10-CM

## 2017-08-07 DIAGNOSIS — Z51.89 ENCOUNTER FOR WOUND RE-CHECK: ICD-10-CM

## 2017-08-07 PROCEDURE — 99283 EMERGENCY DEPT VISIT LOW MDM: CPT

## 2017-08-07 PROCEDURE — A6403 STERILE GAUZE>16 <= 48 SQ IN: HCPCS

## 2017-08-07 ASSESSMENT — PAIN SCALES - GENERAL: PAINLEVEL_OUTOF10: 0

## 2017-08-07 NOTE — ED NOTES
Pt discharged with written instructions. Pt verbalized understanding. Pt's AAOx4. Pt ambulated independently from ER. Pt with dressing in place to wound.

## 2017-08-07 NOTE — ED AVS SNAPSHOT
Home Care Instructions                                                                                                                Kelvin Carr   MRN: [id number]    Department:  [facility], Emergency Dept   Date of Visit:  8/7/2017            [facility], Emergency Dept    [address] [place] [address]    Phone:  [phone]      You were seen by     Edmar Russell M.D.      Your Diagnosis Was     Encounter for wound re-check     Z51.89       Follow-up Information     1. Follow up with [email or web address] In 8 days.    Contact information    [address] [place] [address] [phone]        Medication Information     Review all of your home medications and newly ordered medications with your primary doctor and/or pharmacist as soon as possible. Follow medication instructions as directed by your doctor and/or pharmacist.     Please keep your complete medication list with you and share with your physician. Update the information when medications are discontinued, doses are changed, or new medications (including over-the-counter products) are added; and carry medication information at all times in the event of emergency situations.               Medication List      ASK your doctor about these medications        Instructions    Morning Afternoon Evening Bedtime    acetaminophen 500 MG Tabs   Commonly known as:  TYLENOL        Take 2 Tabs by mouth every 8 hours.   Dose:  1000 mg                        Aspirin 325 MG Tbec        Take 1 Tab by mouth 2 times a day.   Dose:  325 mg                         MG Caps        Take 100 mg by mouth 2 Times a Day.   Dose:  100 mg                        losartan 50 MG Tabs   Commonly known as:  COZAAR        Take 50 mg by mouth every day.   Dose:  50 mg                                  Discharge Instructions       Keep moist with antibiotic oitnment, covered with dressing.  Splint while stitches  are in place.  Stitches out in 8-9 days from now.  Return to  or [facility] for any turn for the worse.          Patient Information     Patient Information    Following emergency treatment: all patient requiring follow-up care must return either to a private physician or a clinic if your condition worsens before you are able to obtain further medical attention, please return to the emergency room.     Billing Information    At [facility], we work to make the billing process streamlined for our patients.  Our Representatives are here to answer any questions you may have regarding your hospital bill.  If you have insurance coverage and have supplied your insurance information to us, we will submit a claim to your insurer on your behalf.  Should you have any questions regarding your bill, we can be reached online or by phone as follows:  Online: You are able pay your bills online or live chat with our representatives about any billing questions you may have. We are here to help Monday - Friday from 8:00am to 7:30pm and 9:00am - 12:00pm on Saturdays.  Please visit [email or web address]  for more information.   Phone:  [phone] or [phone]    Please note that your emergency physician, surgeon, pathologist, radiologist, anesthesiologist, and other specialists are not employed by [facility] and will therefore bill separately for their services.  Please contact them directly for any questions concerning their bills at the numbers below:     Emergency Physician Services:  [phone]  [facility]:  [phone]  [facility]:  [phone]  [facility]:  [phone]    1. Your final bill may vary from the amount quoted upon discharge if all procedures are not complete at that time, or if your doctor has additional procedures of which we are not aware. You will receive an additional bill if you return to the Emergency Department at  Counts include 234 beds at the [facility] for suture removal regardless of the facility of which the sutures were placed.     2. Please arrange for settlement of this account at the emergency registration.    3. All self-pay accounts are due in full at the time of treatment.  If you are unable to meet this obligation then payment is expected within 4-5 days.     4. If you have had radiology studies (CT, X-ray, Ultrasound, MRI), you have received a preliminary result during your emergency department visit. Please contact the radiology department [phone] to receive a copy of your final result. Please discuss the Final result with your primary physician or with the follow up physician provided.     Crisis Hotline:  [place] Crisis Hotline:  9-270-CNBOTUS or 1-496.500.2669  [place] Crisis Hotline:    1-652.428.4457 or 470-718-5910         ED Discharge Follow Up Questions    1. In order to provide you with very good care, we would like to follow up with a phone call in the next few days.  May we have your permission to contact you?     YES /  NO    2. What is the best phone number to call you? (       )_____-__________    3. What is the best time to call you?      Morning  /  Afternoon  /  Evening                   Patient Signature:  ____________________________________________________________    Date:  ____________________________________________________________

## 2017-08-07 NOTE — ED PROVIDER NOTES
ED Provider Note    CHIEF COMPLAINT  Chief Complaint   Patient presents with   • Wound Check       HPI  Kelvin Carr is a 41 y.o. male who presents here is counseled for a recheck of his wound. He had a crush/laceration injury to his hand, this was sewn up on the evening of the 5th. He was asked return in 2 days for recheck. He has done so. Initially, the day after the injury, it was hurting, however presently feels improved. He has not had a fever. There is no other complaint.    PAST MEDICAL HISTORY  Past Medical History   Diagnosis Date   • Hypertension    • Arthritis      Left knee       FAMILY HISTORY  No family history on file.    SOCIAL HISTORY  Social History   Substance Use Topics   • Smoking status: Former Smoker     Quit date: 01/01/2017   • Smokeless tobacco: Never Used   • Alcohol Use: Yes      Comment: 2-3 per week         SURGICAL HISTORY  Past Surgical History   Procedure Laterality Date   • Knee arthroscopy Left 2000, 2008   • Knee arthroplasty total Left 2/1/2017     Procedure: KNEE ARTHROPLASTY TOTAL;  Surgeon: Conor Naylor M.D.;  Location: SURGERY [facility];  Service:        CURRENT MEDICATIONS    I have reviewed the nurses notes and/or the list brought with the patient.    ALLERGIES  Allergies   Allergen Reactions   • Penicillin G Rash     .       REVIEW OF SYSTEMS  See HPI for further details. Review of systems as above, laceration.     PHYSICAL EXAM  VITAL SIGNS: /86 mmHg  Pulse 80  Temp(Src) 36.6 °C (97.9 °F)  Resp 16  Wt 89.9 kg (198 lb 3.1 oz)  SpO2 96%  Constitutional: Well appearing patient in no acute distress.  Not toxic, nor ill in appearance.  Skin: There is a sewn laceration over the dorsal lateral aspect of the small finger of the right hand. This is well approximated, appears to be healing up nicely. There is no erythema or warmth. Limited range of motion testing at FDS and FDP as well as extensors appears intact. He has an abrasion over his hand  at the MCPs, this is nontender without evidence of infection. He also has clinical evidence of an old boxer's fracture, no tenderness or edema to suggest acute fracture.    MEDICAL RECORD  I have reviewed patient's medical record and pertinent results are listed above.    COURSE & MEDICAL DECISION MAKING  I have reviewed any medical record information, laboratory studies and radiographic results as noted above.  This patient presents as counseled for wound check. The wound looks great. Is no evidence of any complication since this point. I recommend he continue to keep the wound moist with antibiotic ointment, cover with dressing. He should wear a splint while stitches are in place. This is to come out in about 8 or 9 days. He he should adjust work accordingly. Referral for [facility].     FINAL IMPRESSION  1. Finger laceration, subsequent encounter    2. Encounter for wound re-check           This dictation was created using voice recognition software.    Electronically signed by: Edmar Russell, 8/7/2017 10:25 AM

## 2017-08-07 NOTE — DISCHARGE INSTRUCTIONS
Keep moist with antibiotic oitnment, covered with dressing.  Splint while stitches are in place.  Stitches out in 8-9 days from now.  Return to ER or [facility] for any turn for the worse.

## 2017-08-07 NOTE — LETTER
[facility], EMERGENCY DEPT   [address] [place] [address]  Phone: Dept: [phone] - Fax:        [facility] Progress Report and Disability Certification  Date of Service: 8/7/2017   No Show:  No  Date / Time of Next Visit:     Claim Information   Patient Name: Kelvin Carr  Claim Number:     Employer: [id number]  Date of Injury: 8/5/2017     Insurer / TPA: [employer] ID / SSN:    Occupation: ASSO. SiOx Diagnosis: The primary encounter diagnosis was Finger laceration, subsequent encounter. A diagnosis of Encounter for wound re-check was also pertinent to this visit.    Medical Information   Related to Industrial Injury? Yes ***   Subjective Complaints:  wound   Objective Findings: wound   Pre-Existing Condition(s): none   Assessment:   Condition Improved    Status: Additional Care Required  Permanent Disability:No    Plan:      Diagnostics:      Comments:       Disability Information   Status: Released to Restricted Duty  Comments:must keep wound clean and dry.  must wear splint while stitches are in place.  adjust work duties accordingly    From:     Through:   Restrictions are:     Physical Restrictions   Sitting:    Standing:    Stooping:    Bending:      Squatting:    Walking:    Climbing:    Pushing:      Pulling:    Other:    Reaching Above Shoulder (L):   Reaching Above Shoulder (R):       Reaching Below Shoulder (L):    Reaching Below Shoulder (R):      Not to exceed Weight Limits   Carrying(hrs):   Weight Limit(lb):   Lifting(hrs):   Weight  Limit(lb):     Comments:      Repetitive Actions   Hands: i.e. Fine Manipulations from Grasping:     Feet: i.e. Operating Foot Controls:     Driving / Operate Machinery:     Physician Name: Edmar Russell Physician Signature: EDMAR Spangler M.D. e-Signature:  , Medical Director  MD   Clinic Name / Location: [facility]  [facility], EMERGENCY DEPT  [address]  [place] [address]  [phone]     Clinic Phone Number: Dept: [phone]   Appointment Time:  Visit Start Time:    Check-In Time:  9:17 AM Visit Discharge Time:    Original-Treating Physician or Chiropractor    Page 2-Insurer/TPA    Page 3-Employer    Page 4-Employee

## 2017-08-07 NOTE — ED AVS SNAPSHOT
UrbanIndo Access Code: [email or web address]  Expires: 9/4/2017  8:49 PM    UrbanIndo  A secure, online tool to manage your health information     [employer]’s UrbanIndo® is a secure, online tool that connects you to your personalized health information from the privacy of your home -- day or night - making it very easy for you to manage your healthcare. Once the activation process is completed, you can even access your medical information using the UrbanIndo xavier, which is available for free in the Apple Xavier store or Google Play store.     UrbanIndo provides the following levels of access (as shown below):   My Chart Features   [facility] Primary Care Doctor [facility]  Specialists [facility]  Urgent  Care Non-[facility]  Primary Care  Doctor   Email your healthcare team securely and privately 24/7 X X X X   Manage appointments: schedule your next appointment; view details of past/upcoming appointments X      Request prescription refills. X      View recent personal medical records, including lab and immunizations X X X X   View health record, including health history, allergies, medications X X X X   Read reports about your outpatient visits, procedures, consult and ER notes X X X X   See your discharge summary, which is a recap of your hospital and/or ER visit that includes your diagnosis, lab results, and care plan. X X       How to register for UrbanIndo:  1. Go to  [email or web address].  2. Click on the Sign Up Now box, which takes you to the New Member Sign Up page. You will need to provide the following information:  a. Enter your UrbanIndo Access Code exactly as it appears at the top of this page. (You will not need to use this code after you’ve completed the sign-up process. If you do not sign up before the expiration date, you must request a new code.)   b. Enter your date of birth.   c. Enter your home email address.   d. Click Submit, and follow the next screen’s instructions.  3. Create a UrbanIndo ID. This will be your [employer]  login ID and cannot be changed, so think of one that is secure and easy to remember.  4. Create a [employer] password. You can change your password at any time.  5. Enter your Password Reset Question and Answer. This can be used at a later time if you forget your password.   6. Enter your e-mail address. This allows you to receive e-mail notifications when new information is available in [employer].  7. Click Sign Up. You can now view your health information.    For assistance activating your [employer] account, call [phone]

## 2017-08-07 NOTE — ED AVS SNAPSHOT
8/7/2017    Kelvin Carr  [address] [place] [address]    Dear Kelvin:    [facility] wants to ensure your discharge home is safe and you or your loved ones have had all of your questions answered regarding your care after you leave the hospital.    Below is a list of resources and contact information should you have any questions regarding your hospital stay, follow-up instructions, or active medical symptoms.    Questions or Concerns Regarding… Contact   Medical Questions Related to Your Discharge  (7 days a week, 8am-5pm) Contact a Nurse Care Coordinator   [phone]   Medical Questions Not Related to Your Discharge  (24 hours a day / 7 days a week)  Contact the Nurse Health Line   [phone]    Medications or Discharge Instructions Refer to your discharge packet   or contact your [facility] Primary Care Provider   [phone]   Follow-up Appointment(s) Schedule your appointment via [employer]   or contact Scheduling [phone]   Billing Review your statement via [employer]  or contact Billing [phone]   Medical Records Review your records via [employer]   or contact Medical Records [phone]     You may receive a telephone call within two days of discharge. This call is to make certain you understand your discharge instructions and have the opportunity to have any questions answered. You can also easily access your medical information, test results and upcoming appointments via the [employer] free online health management tool. You can learn more and sign up at [employer]. For assistance setting up your [employer] account, please call [phone].    Once again, we want to ensure your discharge home is safe and that you have a clear understanding of any next steps in your care. If you have any questions or concerns, please do not hesitate to contact us, we are here for you. Thank you for choosing [facility] for your healthcare needs.    Sincerely,    Your [facility] Healthcare Team

## 2017-08-14 ENCOUNTER — RX ONLY (OUTPATIENT)
Age: 42
Setting detail: RX ONLY
End: 2017-08-14

## 2017-08-17 ENCOUNTER — OCCUPATIONAL MEDICINE (OUTPATIENT)
Dept: OCCUPATIONAL MEDICINE | Facility: CLINIC | Age: 42
End: 2017-08-17
Payer: COMMERCIAL

## 2017-08-17 VITALS
HEIGHT: 67 IN | HEART RATE: 72 BPM | OXYGEN SATURATION: 95 % | SYSTOLIC BLOOD PRESSURE: 130 MMHG | TEMPERATURE: 98.7 F | BODY MASS INDEX: 30.61 KG/M2 | WEIGHT: 195 LBS | DIASTOLIC BLOOD PRESSURE: 98 MMHG

## 2017-08-17 DIAGNOSIS — S61.216A LACERATION OF RIGHT LITTLE FINGER: ICD-10-CM

## 2017-08-17 PROCEDURE — 99202 OFFICE O/P NEW SF 15 MIN: CPT | Performed by: PREVENTIVE MEDICINE

## 2017-08-17 ASSESSMENT — ENCOUNTER SYMPTOMS
CONSTITUTIONAL NEGATIVE: 1
NEUROLOGICAL NEGATIVE: 1

## 2017-08-17 ASSESSMENT — PAIN SCALES - GENERAL: PAINLEVEL: NO PAIN

## 2017-08-17 NOTE — PROGRESS NOTES
"Subjective:      Kelvin Carr is a 41 y.o. male who presents with Other      DOI 8/5/2017. Mechanism of injury-laceration with spring handle. 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 He has no complaints. No numbness or tingling.     Other        Review of Systems   Constitutional: Negative.    Neurological: Negative.      PFSH:  WORK STATUS: Restricted activity  PMH:  has a past medical history of Hypertension and Arthritis.  MEDS:   Current outpatient prescriptions:   •  acetaminophen (TYLENOL) 500 MG Tab, Take 2 Tabs by mouth every 8 hours., Disp: 30 Tab, Rfl: 0  •  aspirin  MG Tablet Delayed Response, Take 1 Tab by mouth 2 times a day., Disp: 30 Tab, Rfl: 0  •  docusate sodium 100 MG Cap, Take 100 mg by mouth 2 Times a Day., Disp: 60 Cap, Rfl: 0  •  losartan (COZAAR) 50 MG Tab, Take 50 mg by mouth every day., Disp: , Rfl:        Objective:     /98 mmHg  Pulse 72  Temp(Src) 37.1 °C (98.7 °F)  Ht 1.702 m (5' 7\")  Wt 88.451 kg (195 lb)  BMI 30.53 kg/m2  SpO2 95%     Physical Exam    Appearance: Well-developed, well-nourished.   Mental Status: Mood and Affect normal. Pleasant. Cooperative. Appropriate.   ENT: Oropharynx clear. Moist mucous membranes. Hearing normal   Eyes: Pupils reactive. Conjunctiva normal. No scleral icterus. .   Musculoskeletal: Right little finger laceration is well healed without drainage, erythema, heat. 7 sutures.         Assessment/Plan:     1. Laceration of right little finger  New to Occupational Health from emergency department  Condition improved  Suture removal  Regular work  Released from care        "

## 2017-08-17 NOTE — MR AVS SNAPSHOT
"Kelvin Carr   2017 8:40 AM   Occupational Medicine   MRN: [id number]    Department:  [facility]   Dept Phone:  [phone]    Description:  Male : 1975   Provider:  Edgar Sidhu M.D.           Reason for Visit     Other New WC DOI 17- R Pinky- ROOM 3      Allergies as of 2017     Allergen Noted Reactions    Penicillin G 2017   Rash    .      You were diagnosed with     Laceration of right little finger   [[id number]]         Vital Signs     Blood Pressure Pulse Temperature Height Weight Body Mass Index    130/98 mmHg 72 37.1 °C (98.7 °F) 1.702 m (5' 7\") 88.451 kg (195 lb) 30.53 kg/m2    Oxygen Saturation Smoking Status                95% Former Smoker          Basic Information     Date Of Birth Sex Race Ethnicity Preferred Language    1975 Male White  Origin (Danish,Albanian,Ecuadorean,Himanshu, etc) English      Problem List              ICD-10-CM Priority Class Noted - Resolved    Post-traumatic osteoarthritis of left knee M17.32   2017 - Present      Health Maintenance     Patient has no pending health maintenance at this time      Current Immunizations     Tdap Vaccine 2017  8:29 PM      Below and/or attached are the medications your provider expects you to take. Review all of your home medications and newly ordered medications with your provider and/or pharmacist. Follow medication instructions as directed by your provider and/or pharmacist. Please keep your medication list with you and share with your provider. Update the information when medications are discontinued, doses are changed, or new medications (including over-the-counter products) are added; and carry medication information at all times in the event of emergency situations     Allergies:  PENICILLIN G - Rash               Medications  Valid as of: 2017 - 11:13 AM    Generic Name Brand Name Tablet Size Instructions for use    Acetaminophen (Tab) TYLENOL " 500 MG Take 2 Tabs by mouth every 8 hours.        Aspirin (Tablet Delayed Response) Aspirin 325 MG Take 1 Tab by mouth 2 times a day.        Docusate Sodium (Cap)  MG Take 100 mg by mouth 2 Times a Day.        Losartan Potassium (Tab) COZAAR 50 MG Take 50 mg by mouth every day.        .                 Medicines prescribed today were sent to:     [id number] - [place], [place] - [address]    [address] [place] [address]    Phone: [phone] Fax: [phone]    Open 24 Hours?: No      Medication refill instructions:       If your prescription bottle indicates you have medication refills left, it is not necessary to call your provider’s office. Please contact your pharmacy and they will refill your medication.    If your prescription bottle indicates you do not have any refills left, you may request refills at any time through one of the following ways: The online [employer] system (except Urgent Care), by calling your provider’s office, or by asking your pharmacy to contact your provider’s office with a refill request. Medication refills are processed only during regular business hours and may not be available until the next business day. Your provider may request additional information or to have a follow-up visit with you prior to refilling your medication.   *Please Note: Medication refills are assigned a new Rx number when refilled electronically. Your pharmacy may indicate that no refills were authorized even though a new prescription for the same medication is available at the pharmacy. Please request the medicine by name with the pharmacy before contacting your provider for a refill.           [employer] Systems Access Code: [email or web address]  Expires: 9/4/2017  8:49 PM    [employer]  A secure, online tool to manage your health information     [employer]’s [employer]® is a secure, online tool that connects you to your personalized health information from the privacy of your home -- day or night -  making it very easy for you to manage your healthcare. Once the activation process is completed, you can even access your medical information using the Zipfit xavier, which is available for free in the Apple Xavier store or Google Play store.     Zipfit provides the following levels of access (as shown below):   My Chart Features   [facility] Doctor [facility]  Specialists [facility]  Urgent  Care Non-[facility]  Primary Care  Doctor   Email your healthcare team securely and privately 24/7 X X X    Manage appointments: schedule your next appointment; view details of past/upcoming appointments X      Request prescription refills. X      View recent personal medical records, including lab and immunizations X X X X   View health record, including health history, allergies, medications X X X X   Read reports about your outpatient visits, procedures, consult and ER notes X X X X   See your discharge summary, which is a recap of your hospital and/or ER visit that includes your diagnosis, lab results, and care plan. X X       How to register for [employer]:  1. Go to  [email or web address].  2. Click on the Sign Up Now box, which takes you to the New Member Sign Up page. You will need to provide the following information:  a. Enter your Zipfit Access Code exactly as it appears at the top of this page. (You will not need to use this code after you’ve completed the sign-up process. If you do not sign up before the expiration date, you must request a new code.)   b. Enter your date of birth.   c. Enter your home email address.   d. Click Submit, and follow the next screen’s instructions.  3. Create a Zipfit ID. This will be your Zipfit login ID and cannot be changed, so think of one that is secure and easy to remember.  4. Create a Zipfit password. You can change your password at any time.  5. Enter your Password Reset Question and Answer. This can be used at a later time if you forget your password.   6. Enter your e-mail  address. This allows you to receive e-mail notifications when new information is available in [facility].  7. Click Sign Up. You can now view your health information.    For assistance activating your [facility] account, call [phone]

## 2017-08-17 NOTE — Clinical Note
[address],   [address] [place] [address]  Phone: [phone] - Fax: [phone]        [facility] Progress Report and Disability Certification  Date of Service: 8/17/2017   No Show:  No  Date / Time of Next Visit:   Discharged    Claim Information   Patient Name: Kelvin Carr  Claim Number:     Employer: [id number] *** Date of Injury: 8/5/2017     Insurer / TPA: Edith/rachael [place] *** ID / SSN:     Occupation: [employer] *** Diagnosis: There were no encounter diagnoses.    Medical Information   Related to Industrial Injury? Yes ***   Subjective Complaints:  DOI 8/5/2017. Mechanism of injury-laceration with spring handle. 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 He has no complaints. No numbness or tingling.   Objective Findings: Appearance: Well-developed, well-nourished.   Mental Status: Mood and Affect normal. Pleasant. Cooperative. Appropriate.   ENT: Oropharynx clear. Moist mucous membranes. Hearing normal   Eyes: Pupils reactive. Conjunctiva normal. No scleral icterus. .   Musculoskeletal: Right little finger laceration is well healed without drainage, erythema, heat. 7 sutures.     Pre-Existing Condition(s):     Assessment:   Condition Improved    Status: Discharged /  MMI  Permanent Disability:No    Plan:      Diagnostics:      Comments:       Disability Information   Status: Released to Full Duty    From:  8/17/2017  Through:   Restrictions are:     Physical Restrictions   Sitting:    Standing:    Stooping:    Bending:      Squatting:    Walking:    Climbing:    Pushing:      Pulling:    Other:    Reaching Above Shoulder (L):   Reaching Above Shoulder (R):       Reaching Below Shoulder (L):    Reaching Below Shoulder (R):      Not to exceed Weight Limits   Carrying(hrs):   Weight Limit(lb):   Lifting(hrs):   Weight  Limit(lb):     Comments:      Repetitive Actions   Hands:  i.e. Fine Manipulations from Grasping:     Feet: i.e. Operating Foot Controls:     Driving / Operate Machinery:     Physician Name: Edgar Sidhu M.D. Physician Signature: EDGAR Palm M.D. e-Signature: Dr. Darien Lott, Medical Director   Clinic Name / Location: [address],   [address]  [place] [address] Clinic Phone Number: Dept: [phone]   Appointment Time: 8:40 Am Visit Start Time: 9:00 AM   Check-In Time:  8:54 Am Visit Discharge Time:  ***   Original-Treating Physician or Chiropractor    Page 2-Insurer/TPA    Page 3-Employer    Page 4-Employee

## 2017-08-17 NOTE — Clinical Note
[address],   [address] [place] [address]  Phone: [phone] - Fax: [phone]        [facility] Progress Report and Disability Certification  Date of Service: 8/17/2017   No Show:  No  Date / Time of Next Visit:   Discharged    Claim Information   Patient Name: Kelvin Carr  Claim Number:     Employer: [id number]  Date of Injury: 8/5/2017     Insurer / TPA: D[employer]h [place]  ID / SSN:     Occupation: [employer]  Diagnosis: The encounter diagnosis was Laceration of right little finger.    Medical Information   Related to Industrial Injury? Yes    Subjective Complaints:  DOI 8/5/2017. Mechanism of injury-laceration with spring handle. 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 He has no complaints. No numbness or tingling.   Objective Findings: Appearance: Well-developed, well-nourished.   Mental Status: Mood and Affect normal. Pleasant. Cooperative. Appropriate.   ENT: Oropharynx clear. Moist mucous membranes. Hearing normal   Eyes: Pupils reactive. Conjunctiva normal. No scleral icterus. .   Musculoskeletal: Right little finger laceration is well healed without drainage, erythema, heat. 7 sutures.     Pre-Existing Condition(s):     Assessment:   Condition Improved    Status: Discharged /  MMI  Permanent Disability:No    Plan:      Diagnostics:      Comments:       Disability Information   Status: Released to Full Duty    From:  8/17/2017  Through:   Restrictions are:     Physical Restrictions   Sitting:    Standing:    Stooping:    Bending:      Squatting:    Walking:    Climbing:    Pushing:      Pulling:    Other:    Reaching Above Shoulder (L):   Reaching Above Shoulder (R):       Reaching Below Shoulder (L):    Reaching Below Shoulder (R):      Not to exceed Weight Limits   Carrying(hrs):   Weight Limit(lb):   Lifting(hrs):   Weight  Limit(lb):     Comments:      Repetitive  Actions   Hands: i.e. Fine Manipulations from Grasping:     Feet: i.e. Operating Foot Controls:     Driving / Operate Machinery:     Physician Name: Edgar Sidhu M.D. Physician Signature: EDGAR Palm M.D. e-Signature: Dr. Darien Lott, Medical Director   Clinic Name / Location: [address],   [address]  [place] [address] Clinic Phone Number: Dept: [phone]   Appointment Time: 8:40 Am Visit Start Time: 9:00 AM   Check-In Time:  8:54 Am Visit Discharge Time: 10:04 AM   Original-Treating Physician or Chiropractor    Page 2-Insurer/TPA    Page 3-Employer    Page 4-Employee

## 2018-04-02 ENCOUNTER — HOSPITAL ENCOUNTER (OUTPATIENT)
Dept: RADIOLOGY | Facility: MEDICAL CENTER | Age: 43
End: 2018-04-02
Attending: NURSE PRACTITIONER
Payer: COMMERCIAL

## 2018-04-02 DIAGNOSIS — S99.922A INJURY OF TOE ON LEFT FOOT, INITIAL ENCOUNTER: ICD-10-CM

## 2018-04-02 PROCEDURE — 73630 X-RAY EXAM OF FOOT: CPT | Mod: LT

## 2022-09-26 ENCOUNTER — OFFICE VISIT (OUTPATIENT)
Dept: URGENT CARE | Facility: CLINIC | Age: 47
End: 2022-09-26
Payer: COMMERCIAL

## 2022-09-26 VITALS
TEMPERATURE: 98.5 F | WEIGHT: 185 LBS | DIASTOLIC BLOOD PRESSURE: 58 MMHG | HEIGHT: 68 IN | OXYGEN SATURATION: 96 % | RESPIRATION RATE: 16 BRPM | BODY MASS INDEX: 28.04 KG/M2 | HEART RATE: 76 BPM | SYSTOLIC BLOOD PRESSURE: 118 MMHG

## 2022-09-26 DIAGNOSIS — S61.012A LACERATION OF SKIN OF LEFT THUMB, INITIAL ENCOUNTER: ICD-10-CM

## 2022-09-26 PROCEDURE — 99213 OFFICE O/P EST LOW 20 MIN: CPT | Performed by: PHYSICIAN ASSISTANT

## 2022-09-26 RX ORDER — SULFAMETHOXAZOLE AND TRIMETHOPRIM 800; 160 MG/1; MG/1
1 TABLET ORAL 2 TIMES DAILY
Qty: 14 TABLET | Refills: 0 | Status: SHIPPED | OUTPATIENT
Start: 2022-09-26 | End: 2022-10-03

## 2022-09-26 ASSESSMENT — ENCOUNTER SYMPTOMS
RESPIRATORY NEGATIVE: 1
CARDIOVASCULAR NEGATIVE: 1
NEUROLOGICAL NEGATIVE: 1
CONSTITUTIONAL NEGATIVE: 1
MUSCULOSKELETAL NEGATIVE: 1
ROS SKIN COMMENTS: LACERATION OF LEFT THUMB

## 2022-09-26 NOTE — PROGRESS NOTES
"  Subjective:     Kelvin Carr  is a 46 y.o. male who presents for Laceration (To LEFT Thumb yesterday @ 1430 cut w/grinding wheel, bleeding stopped w/topical hemostat. )       He presents today with a left thumb laceration that occurred roughly 26 hours ago.  States that the injury occurred while he was using a grinding wheel to cut concrete and he grabbed the spinning wheel by accident.  He immediately cleansed the area and used topical hemostatic ointment to stop the bleeding.  He was able to return to work following the injury and was able to work today.  At this time he has mild tenderness over the laceration but no bleeding or drainage.  No numbness and tingling over the affected area.  Vascular function distally intact.  Thumb range of motion normal.  No fever/chills/sweats, chest pain, shortness of breath.  His last tetanus was in 2017.     Review of Systems   Constitutional: Negative.    Respiratory: Negative.     Cardiovascular: Negative.    Musculoskeletal: Negative.    Skin:         Laceration of left thumb   Neurological: Negative.     Allergies   Allergen Reactions    Penicillin G Rash     .     Past Medical History:   Diagnosis Date    Arthritis     Left knee    Hypertension         Objective:   /58   Pulse 76   Temp 36.9 °C (98.5 °F) (Temporal)   Resp 16   Ht 1.727 m (5' 8\")   Wt 83.9 kg (185 lb)   SpO2 96%   BMI 28.13 kg/m²   Physical Exam  Vitals and nursing note reviewed.   Constitutional:       General: He is not in acute distress.     Appearance: He is not ill-appearing or toxic-appearing.   HENT:      Head: Normocephalic.      Nose: No rhinorrhea.   Eyes:      General: No scleral icterus.     Conjunctiva/sclera: Conjunctivae normal.   Pulmonary:      Effort: Pulmonary effort is normal. No respiratory distress.      Breath sounds: No stridor.   Musculoskeletal:      Cervical back: Neck supple.   Skin:     Comments: There is a a 2.5 cm open laceration of the left thumb. "  No active bleeding or drainage at this time.  Mild tenderness over the laceration.  No nailbed involvement.  Distal vascular function intact.  Thumb strength, range of motion, sensation intact.  No foreign body present in the laceration.   Neurological:      Mental Status: He is alert and oriented to person, place, and time.   Psychiatric:         Mood and Affect: Mood normal.         Behavior: Behavior normal.         Thought Content: Thought content normal.         Judgment: Judgment normal.           Diagnostic testing: None    Assessment/Plan:     Encounter Diagnoses   Name Primary?    Laceration of skin of left thumb, initial encounter           Plan for care for today's complaint includes Bactrim for antibiotic prophylaxis.  Patient is allergic to penicillins so we did choose Bactrim today.  Did discuss with the patient that the laceration is over 24 hours old and will have to heal via secondary closure.  No sutures able to be used at this time.  Did discuss signs of worsening infection with the patient, return to urgent care if the symptoms do arise.  Thumb sensation, strength, range of motion intact today, unlikely that there is tendon involvement.  His last tetanus booster was in 2017 so no updated tetanus needed today..  Prescription for Bactrim provided.    See AVS Instructions below for written guidance provided to patient on after-visit management and care in addition to our verbal discussion during the visit.    Please note that this dictation was created using voice recognition software. I have attempted to correct all errors, but there may be sound-alike, spelling, grammar and possibly content errors that I did not discover before finalizing the note.    Kade Vazquez PA-C